# Patient Record
Sex: MALE
[De-identification: names, ages, dates, MRNs, and addresses within clinical notes are randomized per-mention and may not be internally consistent; named-entity substitution may affect disease eponyms.]

---

## 2017-01-29 ENCOUNTER — HOSPITAL ENCOUNTER (EMERGENCY)
Dept: HOSPITAL 71 - ER | Age: 57
Discharge: HOME | End: 2017-01-29
Payer: SELF-PAY

## 2017-01-29 DIAGNOSIS — Z79.899: ICD-10-CM

## 2017-01-29 DIAGNOSIS — F17.210: ICD-10-CM

## 2017-01-29 DIAGNOSIS — R07.2: Primary | ICD-10-CM

## 2017-01-29 DIAGNOSIS — R07.89: ICD-10-CM

## 2017-01-29 DIAGNOSIS — I10: ICD-10-CM

## 2017-01-29 PROCEDURE — 93005 ELECTROCARDIOGRAM TRACING: CPT

## 2017-01-29 PROCEDURE — 84484 ASSAY OF TROPONIN QUANT: CPT

## 2017-01-29 PROCEDURE — 36415 COLL VENOUS BLD VENIPUNCTURE: CPT

## 2017-01-29 PROCEDURE — 96374 THER/PROPH/DIAG INJ IV PUSH: CPT

## 2017-01-29 PROCEDURE — 85025 COMPLETE CBC W/AUTO DIFF WBC: CPT

## 2017-01-29 PROCEDURE — 71010: CPT

## 2017-01-29 PROCEDURE — 85610 PROTHROMBIN TIME: CPT

## 2017-01-29 PROCEDURE — 82550 ASSAY OF CK (CPK): CPT

## 2017-01-29 PROCEDURE — 85730 THROMBOPLASTIN TIME PARTIAL: CPT

## 2017-01-29 PROCEDURE — 83735 ASSAY OF MAGNESIUM: CPT

## 2017-01-29 PROCEDURE — 80053 COMPREHEN METABOLIC PANEL: CPT

## 2020-02-18 ENCOUNTER — OFFICE VISIT CONVERTED (OUTPATIENT)
Dept: GASTROENTEROLOGY | Facility: CLINIC | Age: 60
End: 2020-02-18
Attending: NURSE PRACTITIONER

## 2020-05-04 ENCOUNTER — OFFICE VISIT CONVERTED (OUTPATIENT)
Dept: CARDIOLOGY | Facility: CLINIC | Age: 60
End: 2020-05-04
Attending: INTERNAL MEDICINE

## 2020-07-23 ENCOUNTER — OFFICE VISIT CONVERTED (OUTPATIENT)
Dept: SURGERY | Facility: CLINIC | Age: 60
End: 2020-07-23
Attending: SURGERY

## 2020-09-21 ENCOUNTER — HOSPITAL ENCOUNTER (OUTPATIENT)
Dept: PREADMISSION TESTING | Facility: HOSPITAL | Age: 60
Discharge: HOME | End: 2020-09-21
Attending: SURGERY

## 2020-09-22 LAB — SARS-COV-2 RNA SPEC QL NAA+PROBE: NOT DETECTED

## 2020-09-25 ENCOUNTER — HOSPITAL ENCOUNTER (OUTPATIENT)
Dept: GASTROENTEROLOGY | Facility: HOSPITAL | Age: 60
Setting detail: HOSPITAL OUTPATIENT SURGERY
Discharge: HOME | End: 2020-09-25
Attending: SURGERY

## 2021-05-10 NOTE — H&P
"   History and Physical      Patient Name: Kody Garcia   Patient ID: [id number]   Sex: Male   YOB: 1960        Visit Date: July 23, 2020    Provider: Renny Parks MD   [facility]   Location Address: [address] [place] [address]  [id number]   Location Phone: [phone]          Chief Complaint  · Outpatient History & Physical / Surgical Orders  · Colon Consult      History Of Present Illness  Kody Garcia is a 60 year old /Black male who presents today for evaluation for a colonoscopy. The patient had a colonoscopy many years ago, possibly 20 years ago in [place]. At that time, he was apparently undergoing a drug rehab evaluation and they sent him for a colonoscopy for that. He reports that colonoscopy was normal. He hasn't had a colonoscopy since. He is not currently having any symptoms. He reports occasional left lower quadrant abdominal pain but it is very intermittent. No nausea or vomiting. No blood per rectum, narrowed or pencil thin stools. No unexplained weight loss. He does not have a known family history of colon cancer.       Past Medical History  Essential hypertension; Gastric Ulcer         Past Surgical History  *No Past Surgical History         Medication List  amlodipine 10 mg oral tablet; lisinopril 40 mg oral tablet         Allergy List  NO KNOWN DRUG ALLERGIES       Allergies Reconciled  Family Medical History  No family history of colorectal cancer; Family history of heart disease         Social History  Alcohol use (Never); lives alone; Single; Tobacco (Former); Working         Review of Systems  · Gastrointestinal  o Denies  o : nausea, vomiting, diarrhea, constipation      Vitals  Date Time BP Position Site L\R Cuff Size HR RR TEMP (F) WT  HT  BMI kg/m2 BSA m2 O2 Sat        07/23/2020 10:23 AM       15  240lbs 0oz 6'  1\" 31.66 2.37           Physical Examination  · Constitutional  o Appearance  o : well developed, " well-nourished, alert and in no acute distress  · Head and Face  o Head  o :   § Inspection  § : no deformities or lesions  · Eyes  o Conjunctivae  o : clear  o Sclerae  o : clear  · Neck  o Inspection/Palpation  o : normal appearance, no masses or tenderness, trachea midline  · Respiratory  o Respiratory Effort  o : breathing unlabored  o Inspection of Chest  o : normal appearance, no retractions  · Cardiovascular  o Heart  o : regular rate and rhythm  · Gastrointestinal  o Abdominal Examination  o : abdomen is soft  · Lymphatic  o Neck  o : no lymphadenopathy present  o Axilla  o : no lymphadenopathy present  o Groin  o : no lymphadenopathy present  · Skin and Subcutaneous Tissue  o General Inspection  o : no rashes present, no lesions present, no areas of discoloration  · Neurologic  o Cranial Nerves  o : grossly intact  o Sensation  o : grossly intact  o Gait and Station  o :   § Gait Screening  § : normal gait, able to stand without diffculty  o Cerebellar Function  o : no obvious abnormalities  · Psychiatric  o Judgement and Insight  o : judgment and insight intact  o Mood and Affect  o : mood normal, affect appropriate          Assessment  · Pre-Surgical Orders     V72.84  · Screening for colon cancer     V76.51/Z12.11  · Pre-op testing     V72.84/Z01.818       Patient in need of a colonoscopy for screening     Problems Reconciled  Plan  · Orders  o General Surgery Order (GENOR) - V76.51/Z12.11 - 09/25/2020  o [facility] Pre-Op Covid-19 Screening (31732) - V72.84/Z01.818 - 09/21/2020 09/21 @ 8AM -- [address], [place].  · Medications  o Suprep Bowel Prep Kit 17.5-3.13-1.6 gram oral recon soln   SIG: take as directed for 1 day   DISP: (1) 177 ml bottle with 0 refills  Prescribed on 07/23/2020     o Medications have been Reconciled  o Transition of Care or Provider Policy  · Instructions  o PLAN:   o Handouts Provided-Pre-Procedure Instructions including date and time and location of procedure.  o Surgical  Facility: [facility]  o ****Patient Status****  o Outpatient  o ********************  o RISK AND BENEFITS:  o Consent for surgery: Given these options, the patient has verbally expressed an understanding of the risks of surgery and finds these risks acceptable. We will proceed with surgery as soon as possible.  o Consult Anesthesia for any post-operative block, or any pain management procedure deemed necessary by the anestesiologist for adequate post-operative pain control.   o O.R. PREP: Per protocol  o IV: Per Anesthesia  o PLEASE SIGN PERMIT FOR: COLONOSCOPY WITH POSSIBLE BIOPSIES  o *___The above History and Physical Examination has been completed within 30 days of admission.  o Electronically Identified Patient Education Materials Provided Electronically     We will plan for a screening colonoscopy in the near future. Risks, benefits, and alternatives were discussed with the patient extensively. All questions were answered. The patient voiced understanding and agrees to proceed with the above plan.             Electronically Signed by: Radha Thomas-, -Author on July 23, 2020 03:40:10 PM  Electronically Co-signed by: Renny Parks MD -Reviewer on July 23, 2020 07:50:43 PM

## 2021-05-10 NOTE — H&P
History and Physical      Patient Name: Kody Garcia   Patient ID: [id number]   Sex: Male   YOB: 1960    Primary Care Provider: Provider Other Other   Referring Provider: Saige VIRGEN    Visit Date: May 4, 2020    Provider: Edgar Escamilla MD   Location: [facility]   Location Address: [address] [place] [address], [address]   [place] Zhu  [id number]   Location Phone: [phone]          Chief Complaint  · Reason for Consultation: Abdominal aortic aneurysm.      History Of Present Illness  Consult requested by: Saige VIRGEN   Kody Garcia is a 60 year old /Black male with hypertension and hypertensive heart disease. He was recently seen in the emergency room because of abdominal pain, and as part of his workup, he underwent a CT scan of the abdomen, which incidentally showed the presence of a mildly ectatic abdominal aorta measuring 2.4 cm in diameter. He was subsequently seen by a GI specialist, and a colonoscopy was recommended. However, the procedure was put on hold because of the COVID pandemic. He was referred for further evaluation of the ascending aortic aneurysm. Patient has no history of any cardiac illness in the past. He denies having any chest pain, tightness, or pressure. No shortness of breath on activities. 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 No dizziness. No syncopal episodes. Previous cardiac workup with stress echocardiogram in 2015 showed no ischemic but evidence of hypertensive heart disease.   PAST MEDICAL HISTORY: 1) Hypertension with hypertensive heart disease; 2) Negative for diabetes mellitus or coronary artery disease.   PSYCHOSOCIAL HISTORY: The patient is a current smoker, who smokes 5-6 cigarettes per day. Drinks alcohol rarely. No recreational drug usage.   FAMILY HISTORY: Reviewed. No family history of premature coronary artery disease. Patient's mother had coronary artery bypass grafting at the age  "of 70 years.   CURRENT MEDICATIONS: Amlodipine 10 mg daily; lisinopril 40 mg daily. The dosage and frequency of the medications were reviewed with the patient.   ALLERGIES: No known drug allergies.       Review of Systems  · Constitutional  o Admits  o : good general health lately  o Denies  o : fatigue, recent weight changes   · Eyes  o Denies  o : double vision  · HENT  o Denies  o : hearing loss or ringing, chronic sinus problem, swollen glands in neck  · Cardiovascular  o Denies  o : chest pain, palpitations (fast, fluttering, or skipping beats), swelling (feet, ankles, hands), shortness of breath while walking or lying flat  · Respiratory  o Denies  o : chronic or frequent cough, asthma or wheezing, COPD  · Gastrointestinal  o Denies  o : ulcers, nausea or vomiting  · Neurologic  o Denies  o : lightheaded or dizzy, stroke, headaches  · Musculoskeletal  o Denies  o : joint pain, back pain  · Endocrine  o Denies  o : thyroid disease, diabetes, heat or cold intolerance, excessive thirst or urination  · Heme-Lymph  o Denies  o : bleeding or bruising tendency, anemia      Vitals  Date Time BP Position Site L\R Cuff Size HR RR TEMP (F) WT  HT  BMI kg/m2 BSA m2 O2 Sat HC       05/04/2020 08:12 /100 Sitting    68 - R   241lbs 0oz 6'  1\" 31.8 2.37     05/04/2020 08:12 /96 Sitting                     Physical Examination  · Constitutional  o Appearance  o : Awake, alert, in no acute distress.  · Head and Face  o HEENT  o : No pallor, anicteric. Eyes normal. Moist mucous membranes.  · Neck  o Inspection/Palpation  o : Supple. No hepatosplenomegaly.  o Jugular Veins  o : No JVD. No carotid bruits.  · Respiratory  o Auscultation of Lungs  o : Clear to auscultation bilaterally. No crackles or wheezing.  · Cardiovascular  o Heart  o : S1, S2 normally heard. No S3. No murmur, rubs, or gallops.  · Gastrointestinal  o Abdominal Examination  o : Soft, non-distended. No palpable hepatosplenomegaly. Bowel sounds heard " in all four quadrants.  · Musculoskeletal  o General  o : Normal muscle tone and strength.  · Skin and Subcutaneous Tissue  o General Inspection  o : No skin rashes.  · Extremities  o Extremities  o : Warm and well perfused. Distal pulses present. No pitting pedal edema.  · EKG  o EKG  o : Performed in the office today.  o Indications  o : Abdominal aortic aneurysm.  o Results  o : Normal sinus rhythm. LVH with repolarization abnormalities. T-wave abnormalities consistent with LVH. Abnormal EKG.  o Comparison  o : When compared to previous EKG on 07/20/2019, there are no changes.   · Labs  o Labs  o : On 01/02/2020, hemoglobin 13.1, hematocrit 41.5, WBC 6.48, platelet count 296,000, sodium 141, potassium 4.2, BUN 12, creatinine 1.06, AST 23, ALT 18.          Assessment     ASSESSMENT & PLAN:    1.  Abdominal aortic aneurysm.  Measures 2.4 cm which is small.  Counseled regarding tobacco cessation and        also adequate blood pressure control to arrest further progression.  Will schedule annual surveillance       ultrasound in January 2021.    2.  Hypertension with evidence of hypertensive heart disease per EKG and per echocardiogram in 2015.  Blood        pressures were elevated in the office.  Recommended the patient to keep a log of his blood pressure and        return it to us in the next 2-3 weeks, after which medication adjustments can be made.  Counseled        regarding medication compliance as well.    3.  Follow up in 9 months with repeat ultrasound of abdomen.  In the meantime, we will review the blood        pressure log once returned.        Edgar Escamilla MD  JBUCK/shelley                 Electronically Signed by: Jeanette Lewis-, Other -Author on May 7, 2020 10:00:39 AM  Electronically Co-signed by: Edgar Escamilla MD -Reviewer on May 7, 2020 11:59:42 AM

## 2021-05-15 VITALS
HEIGHT: 73 IN | BODY MASS INDEX: 31.34 KG/M2 | HEART RATE: 72 BPM | DIASTOLIC BLOOD PRESSURE: 109 MMHG | WEIGHT: 236.5 LBS | SYSTOLIC BLOOD PRESSURE: 175 MMHG

## 2021-05-15 VITALS — BODY MASS INDEX: 31.81 KG/M2 | RESPIRATION RATE: 15 BRPM | WEIGHT: 240 LBS | HEIGHT: 73 IN

## 2021-05-15 VITALS
HEIGHT: 73 IN | HEART RATE: 68 BPM | WEIGHT: 241 LBS | DIASTOLIC BLOOD PRESSURE: 100 MMHG | SYSTOLIC BLOOD PRESSURE: 158 MMHG | BODY MASS INDEX: 31.94 KG/M2

## 2021-08-24 ENCOUNTER — OFFICE VISIT (OUTPATIENT)
Dept: CARDIOLOGY | Facility: CLINIC | Age: 61
End: 2021-08-24

## 2021-08-24 VITALS
WEIGHT: 217 LBS | BODY MASS INDEX: 28.76 KG/M2 | HEART RATE: 76 BPM | DIASTOLIC BLOOD PRESSURE: 96 MMHG | HEIGHT: 73 IN | SYSTOLIC BLOOD PRESSURE: 152 MMHG

## 2021-08-24 DIAGNOSIS — I10 ESSENTIAL HYPERTENSION: ICD-10-CM

## 2021-08-24 DIAGNOSIS — I71.40 AAA (ABDOMINAL AORTIC ANEURYSM) WITHOUT RUPTURE (HCC): Primary | ICD-10-CM

## 2021-08-24 DIAGNOSIS — E78.2 MIXED HYPERLIPIDEMIA: ICD-10-CM

## 2021-08-24 PROCEDURE — 99214 OFFICE O/P EST MOD 30 MIN: CPT | Performed by: INTERNAL MEDICINE

## 2021-08-24 NOTE — PROGRESS NOTES
CARDIOLOGY FOLLOW-UP PROGRESS NOTE        Chief Complaint  Follow-up and Hypertension, abdominal aortic aneurysm    Subjective            Kody Garcia presents to [facility] CARDIOLOGY  History of Present Illness    This is a 61-year-old male with hypertension, hypertensive heart disease who was previously seen and evaluated for abdominal CT scan showing ectatic abdominal aorta.  The findings were discussed with the patient including the need for strict blood pressure control and tobacco cessation.  He was last seen in the office in May 2020 and since then lost for follow-up.  Today patient denies having any new complaints.  Denies having any chest pain, shortness of breath or palpitations.  He was diagnosed with SARS-CoV-2 2 weeks back with mild symptoms.  He still has cough and reports having abdominal pain when coughing.  He denies having any fever or chills.  He is out of his nifedipine for the past 1 week but taking losartan HCTZ daily.       Past History:    Medical History: has a past medical history of Essential hypertension and Gastric ulcer.     Surgical History: has no past surgical history on file.     Family History: family history includes Heart disease in his mother; Hyperlipidemia in his mother; Hypertension in his mother.     Social History: reports that he has quit smoking. He has never used smokeless tobacco. He reports that he does not drink alcohol and does not use drugs.    Allergies: Patient has no known allergies.    Current Outpatient Medications on File Prior to Visit   Medication Sig   • losartan-hydrochlorothiazide (HYZAAR) 100-12.5 MG per tablet Take 1 tablet by mouth Daily.   • NIFEdipine XL (PROCARDIA XL) 30 MG 24 hr tablet Take 30 mg by mouth Daily.     No current facility-administered medications on file prior to visit.          Review of Systems   Respiratory: Positive for shortness of breath (Recent SARS-CoV-2, tested +2 weeks back). Negative for cough and  "wheezing.    Cardiovascular: Negative for chest pain, palpitations and leg swelling.   Gastrointestinal: Negative for nausea and vomiting.   Neurological: Negative for dizziness and syncope.        Objective     /96   Pulse 76   Ht 185.4 cm (73\")   Wt 98.4 kg (217 lb)   BMI 28.63 kg/m²       Physical Exam  General : Alert, awake, no acute distress  CVS : Regular rate and rhythm, no murmur, rubs or gallops  Lungs: Clear to auscultation bilaterally, no crackles or rhonchi  Abdomen: Soft, nontender, bowel sounds heard in all 4 quadrants  Extremities: Warm, well-perfused, no pedal edema    Result Review :     The following data was reviewed by: Edgar Escamilla MD on 08/24/2021:      CBC    CBC 6/28/21   WBC 5.62   RBC 4.64   Hemoglobin 12.8 (A)   Hematocrit 39.9 (A)   MCV 86.0   MCH 27.6   MCHC 32.1   RDW 13.8   Platelets 296   (A) Abnormal value                       Assessment and Plan        Diagnoses and all orders for this visit:    1. AAA (abdominal aortic aneurysm) without rupture (CMS/HCC) (Primary)  -     Ultrasound Abdominal Vascular Limited CAR; Future  Ectatic abdominal aorta measuring 2.4 cm per CT scan done in 2020.  We will proceed with an ultrasound of the abdomen to reassess the size.  Patient quit smoking 2 weeks back.  Blood pressure is on the higher side which needs tighter control.    2. Essential hypertension    Blood pressure on the higher side today.  Patient is out of nifedipine for the past 1 week but picking up the refills today.  Recommend to continue same medications for now    3. Mixed hyperlipidemia    .  Since the patient has abdominal aortic aneurysm, recommend statin therapy to LDL below 70.  He reports allergy to Crestor and does not want to be on medicines for now.  Hence I counseled regarding dietary modification and regular exercise.  We will recheck lipid panel in 6 months.        Follow Up     Return in about 6 months (around 2/24/2022) for Recheck, Next " scheduled follow up.    Patient was given instructions and counseling regarding his condition or for health maintenance advice. Please see specific information pulled into the AVS if appropriate.

## 2021-09-02 ENCOUNTER — HOSPITAL ENCOUNTER (OUTPATIENT)
Dept: CARDIOLOGY | Facility: HOSPITAL | Age: 61
Discharge: HOME | End: 2021-09-02
Admitting: INTERNAL MEDICINE

## 2021-09-02 DIAGNOSIS — I71.40 AAA (ABDOMINAL AORTIC ANEURYSM) WITHOUT RUPTURE (HCC): ICD-10-CM

## 2021-09-02 LAB
ABDOMINAL DIST AORTA AP: 1.7 CM
ABDOMINAL DIST AORTA TRANS: 1.7 CM
ABDOMINAL LT COM ILIAC AP: 1.1 CM
ABDOMINAL LT COM ILIAC TRANS: 1 CM
ABDOMINAL MID AORTA AP: 1.7 CM
ABDOMINAL MID AORTA TRANS: 1.7 CM
ABDOMINAL PROX AORTA AP: 2 CM
ABDOMINAL PROX AORTA TRANS: 2 CM
ABDOMINAL RT COM ILIAC AP: 1 CM
ABDOMINAL RT COM ILIAC TRANS: 1 CM
BH CV VAS SMA 1ST PP TIME: 15 MIN
BH CV VAS SMA 2ND PP TIME: 30 MIN
BH CV VAS SMA 3RD PP TIME: 45 MIN
MAXIMAL PREDICTED HEART RATE: 160 BPM
STRESS TARGET HR: 136 BPM

## 2021-09-02 PROCEDURE — 93979 VASCULAR STUDY: CPT

## 2021-09-02 PROCEDURE — 93979 VASCULAR STUDY: CPT | Performed by: SURGERY

## 2021-09-03 ENCOUNTER — TELEPHONE (OUTPATIENT)
Dept: CARDIOLOGY | Facility: CLINIC | Age: 61
End: 2021-09-03

## 2021-09-03 NOTE — TELEPHONE ENCOUNTER
----- Message from Edgar Escamilla MD sent at 9/3/2021 12:45 PM EDT -----  The size of the abdominal aorta is 2 cm, which does not meet the criteria for aneurysm.  Currently no abdominal aneurysm.    On previous CT scan it was reported as 2.6 cm.    No further testing is needed at this time  Continue current medications  Follow-up in 6 months as scheduled earlier.

## 2021-09-03 NOTE — TELEPHONE ENCOUNTER
Left detailed VM with patient regarding results of abdominal u/s. Advised to keep f/u as scheduled.

## 2022-02-16 ENCOUNTER — TRANSCRIBE ORDERS (OUTPATIENT)
Dept: ADMINISTRATIVE | Facility: HOSPITAL | Age: 62
End: 2022-02-16

## 2022-02-16 DIAGNOSIS — R10.9 ABDOMINAL PAIN, UNSPECIFIED ABDOMINAL LOCATION: Primary | ICD-10-CM

## 2022-07-14 ENCOUNTER — OFFICE VISIT (OUTPATIENT)
Dept: PODIATRY | Facility: CLINIC | Age: 62
End: 2022-07-14

## 2022-07-14 VITALS
HEART RATE: 62 BPM | OXYGEN SATURATION: 97 % | WEIGHT: 231.8 LBS | BODY MASS INDEX: 30.72 KG/M2 | HEIGHT: 73 IN | TEMPERATURE: 96.7 F | SYSTOLIC BLOOD PRESSURE: 154 MMHG | DIASTOLIC BLOOD PRESSURE: 86 MMHG

## 2022-07-14 DIAGNOSIS — M79.671 FOOT PAIN, RIGHT: Primary | ICD-10-CM

## 2022-07-14 DIAGNOSIS — L89.891 PRESSURE INJURY OF RIGHT FOOT, STAGE 1: ICD-10-CM

## 2022-07-14 PROCEDURE — 97597 DBRDMT OPN WND 1ST 20 CM/<: CPT | Performed by: PODIATRIST

## 2022-07-14 PROCEDURE — 99203 OFFICE O/P NEW LOW 30 MIN: CPT | Performed by: PODIATRIST

## 2022-07-14 RX ORDER — DICLOFENAC SODIUM 75 MG/1
75 TABLET, DELAYED RELEASE ORAL
COMMUNITY
Start: 2022-06-30

## 2022-07-14 RX ORDER — METHOCARBAMOL 750 MG/1
750 TABLET, FILM COATED ORAL 4 TIMES DAILY
COMMUNITY
Start: 2022-06-30

## 2022-07-14 NOTE — PROGRESS NOTES
[facility] - PODIATRY    Today's Date: 07/14/22    Patient Name: Kody Garcia  MRN: [id number]  CSN: [id number]  PCP: Iona Ravi APRN, Last PCP Visit:  5/9/2022  Referring Provider: Referring, Self    SUBJECTIVE     Chief Complaint   Patient presents with   • Right Foot - Callouses, Establish Care     HPI: Kody Garcia, a 62 y.o.male, comes presents to clinic with chief complaint of:    New, Established, New Problem: New    Location:  hyperkeratotic lesion(s) on plantar right fifth metatarsal head    Duration: Early 2022    Onset:  gradual    Nature:  sore    Stable, worsening, improving: Worsening    Aggravating factors:  Patient reports lesion(s) is painful with shoegear and ambulation.      Previous Treatment:   None    Past Medical History:   Diagnosis Date   • Callus    • Essential hypertension    • Gastric ulcer      History reviewed. No pertinent surgical history.  Family History   Problem Relation Age of Onset   • Hyperlipidemia Mother    • Hypertension Mother    • Heart disease Mother    • Cancer Neg Hx    • Diabetes Neg Hx    • Stroke Neg Hx      Social History     Socioeconomic History   • Marital status: Single   Tobacco Use   • Smoking status: Former Smoker   • Smokeless tobacco: Never Used   • Tobacco comment: Quit 3 weeks   Vaping Use   • Vaping Use: Never used   Substance and Sexual Activity   • Alcohol use: Never   • Drug use: Never   • Sexual activity: Defer     No Known Allergies  Current Outpatient Medications   Medication Sig Dispense Refill   • diclofenac (VOLTAREN) 75 MG EC tablet Take 75 mg by mouth.     • losartan-hydrochlorothiazide (HYZAAR) 100-12.5 MG per tablet Take 1 tablet by mouth Daily.     • methocarbamol (ROBAXIN) 750 MG tablet Take 750 mg by mouth 4 (Four) Times a Day.     • NIFEdipine XL (PROCARDIA XL) 30 MG 24 hr tablet Take 30 mg by mouth Daily.       No current facility-administered medications for this visit.     Review of Systems   Constitutional:  Negative.    Skin:        Hyperkeratotic tissue on plantar right fifth metatarsal head   All other systems reviewed and are negative.      OBJECTIVE     Vitals:    07/14/22 1455   BP: 154/86   Pulse: 62   Temp: 96.7 °F (35.9 °C)   SpO2: 97%       Body mass index is 30.58 kg/m².    Lab Results   Component Value Date    GLUCOSE 82 01/02/2020    CALCIUM 9.5 05/26/2020     05/26/2020    K 4.0 05/26/2020    CO2 26 05/26/2020     05/26/2020    BUN 17 05/26/2020    CREATININE 1.0 05/26/2020    BCR 17.0 05/26/2020    ANIONGAP 11 01/02/2020       Patient seen in no apparent distress.      PHYSICAL EXAM:     Foot/Ankle Exam:       General:   Appearance: appears stated age and healthy    Orientation: AAOx3    Affect: appropriate    Gait: unimpaired    Shoe Gear:  Casual shoes    VASCULAR      Right Foot Vascularity   Normal vascular exam    Dorsalis pedis:  2+  Posterior tibial:  2+  Skin Temperature: warm    Edema Grading:  None  CFT:  < 3 seconds  Pedal Hair Growth:  Present  Varicosities: none       Left Foot Vascularity   Normal vascular exam    Dorsalis pedis:  2+  Posterior tibial:  2+  Skin Temperature: warm    Edema Grading:  None  CFT:  < 3 seconds  Pedal Hair Growth:  Present  Varicosities: none        NEUROLOGIC     Right Foot Neurologic   Normal sensation    Light touch sensation:  Normal  Vibratory sensation:  Normal  Hot/Cold sensation: normal    Protective Sensation using [place]-Eze Monofilament:  10     Left Foot Neurologic   Normal sensation    Light touch sensation:  Normal  Vibratory sensation:  Normal  Hot/cold sensation: normal    Protective Sensation using [place]-Eze Monofilament:  10     MUSCLE STRENGTH     Right Foot Muscle Strength   Foot dorsiflexion:  4  Foot plantar flexion:  4  Foot inversion:  4  Foot eversion:  4     Left Foot Muscle Strength   Foot dorsiflexion:  4  Foot plantar flexion:  4  Foot inversion:  4  Foot eversion:  4     RANGE OF MOTION      Right Foot Range  of Motion   Foot and ankle ROM within normal limits       Left Foot Range of Motion   Foot and ankle ROM within normal limits       DERMATOLOGIC     Right Foot Dermatologic   Skin: ulcer    Nails: normal       Left Foot Dermatologic   Skin: skin intact    Nails: normal        Right Foot Additional Comments Stage 1 ulceration on the plantar right fifth metatarsal head area of the foot.  Hyperkeratotic tissue with subepidermal hemosiderin deposition is present.  No surrounding, edema, erythema, lymphangitis, fluctuance, nor signs of infection.  No drainage present.  13 mm x 10 mm x 3 mm.  This area was debrided via excisional partial thickness debridement with a 10 scalpel blade.  Post debridement measurements were 10 mm x 9 mm x 1 mm in depth.        ASSESSMENT/PLAN     Diagnoses and all orders for this visit:    1. Foot pain, right (Primary)    2. Pressure injury of right foot, stage 1        Comprehensive lower extremity examination and evaluation was performed.    Discussed findings and treatment plan including risks, benefits, and treatment options with patient in detail. Patient agreed with treatment plan.    An After Visit Summary was printed and given to the patient at discharge, including (if requested) any available informative/educational handouts regarding diagnosis, treatment, or medications. All questions were answered to patient/family satisfaction. Should symptoms fail to improve or worsen they agree to call or return to clinic or to go to the Emergency Department. Discussed the importance of following up with any needed screening tests/labs/specialist appointments and any requested follow-up recommended by me today. Importance of maintaining follow-up discussed and patient accepts that missed appointments can delay diagnosis and potentially lead to worsening of conditions.    Return in about 9 weeks (around 9/15/2022) for Ulcer Follow-Up., or sooner if acute issues arise.    This document has been  electronically signed by Baron Peres DPM on July 14, 2022 15:28 EDT

## 2022-08-04 ENCOUNTER — TRANSCRIBE ORDERS (OUTPATIENT)
Dept: ADMINISTRATIVE | Facility: HOSPITAL | Age: 62
End: 2022-08-04

## 2022-08-04 DIAGNOSIS — R10.32 LEFT LOWER QUADRANT ABDOMINAL PAIN: Primary | ICD-10-CM

## 2022-08-26 ENCOUNTER — HOSPITAL ENCOUNTER (OUTPATIENT)
Dept: ULTRASOUND IMAGING | Facility: HOSPITAL | Age: 62
Discharge: HOME | End: 2022-08-26
Admitting: NURSE PRACTITIONER

## 2022-08-26 DIAGNOSIS — R10.32 LEFT LOWER QUADRANT ABDOMINAL PAIN: ICD-10-CM

## 2022-08-26 PROCEDURE — 76700 US EXAM ABDOM COMPLETE: CPT

## 2023-02-10 ENCOUNTER — HOSPITAL ENCOUNTER (EMERGENCY)
Facility: HOSPITAL | Age: 63
Discharge: HOME | End: 2023-02-10
Attending: EMERGENCY MEDICINE | Admitting: EMERGENCY MEDICINE
Payer: COMMERCIAL

## 2023-02-10 ENCOUNTER — APPOINTMENT (OUTPATIENT)
Dept: CT IMAGING | Facility: HOSPITAL | Age: 63
End: 2023-02-10
Payer: COMMERCIAL

## 2023-02-10 VITALS
HEART RATE: 84 BPM | HEIGHT: 73 IN | BODY MASS INDEX: 31.12 KG/M2 | RESPIRATION RATE: 16 BRPM | TEMPERATURE: 98.1 F | WEIGHT: 234.79 LBS | DIASTOLIC BLOOD PRESSURE: 101 MMHG | SYSTOLIC BLOOD PRESSURE: 164 MMHG | OXYGEN SATURATION: 95 %

## 2023-02-10 DIAGNOSIS — R10.84 GENERALIZED ABDOMINAL PAIN: Primary | ICD-10-CM

## 2023-02-10 LAB
ALBUMIN SERPL-MCNC: 4.5 G/DL (ref 3.5–5.2)
ALBUMIN/GLOB SERPL: 1.4 G/DL
ALP SERPL-CCNC: 69 U/L (ref 39–117)
ALT SERPL W P-5'-P-CCNC: 26 U/L (ref 1–41)
ANION GAP SERPL CALCULATED.3IONS-SCNC: 10.6 MMOL/L (ref 5–15)
AST SERPL-CCNC: 25 U/L (ref 1–40)
BASOPHILS # BLD AUTO: 0.03 10*3/MM3 (ref 0–0.2)
BASOPHILS NFR BLD AUTO: 0.4 % (ref 0–1.5)
BILIRUB SERPL-MCNC: 0.5 MG/DL (ref 0–1.2)
BILIRUB UR QL STRIP: NEGATIVE
BUN SERPL-MCNC: 23 MG/DL (ref 8–23)
BUN/CREAT SERPL: 17.3 (ref 7–25)
CALCIUM SPEC-SCNC: 10.1 MG/DL (ref 8.6–10.5)
CHLORIDE SERPL-SCNC: 103 MMOL/L (ref 98–107)
CLARITY UR: CLEAR
CO2 SERPL-SCNC: 25.4 MMOL/L (ref 22–29)
COLOR UR: YELLOW
CREAT SERPL-MCNC: 1.33 MG/DL (ref 0.76–1.27)
D-LACTATE SERPL-SCNC: 0.7 MMOL/L (ref 0.5–2)
DEPRECATED RDW RBC AUTO: 42.5 FL (ref 37–54)
EGFRCR SERPLBLD CKD-EPI 2021: 60.8 ML/MIN/1.73
EOSINOPHIL # BLD AUTO: 0.02 10*3/MM3 (ref 0–0.4)
EOSINOPHIL NFR BLD AUTO: 0.2 % (ref 0.3–6.2)
ERYTHROCYTE [DISTWIDTH] IN BLOOD BY AUTOMATED COUNT: 13.7 % (ref 12.3–15.4)
GLOBULIN UR ELPH-MCNC: 3.2 GM/DL
GLUCOSE SERPL-MCNC: 109 MG/DL (ref 65–99)
GLUCOSE UR STRIP-MCNC: NEGATIVE MG/DL
HCT VFR BLD AUTO: 41.2 % (ref 37.5–51)
HGB BLD-MCNC: 13.7 G/DL (ref 13–17.7)
HGB UR QL STRIP.AUTO: NEGATIVE
HOLD SPECIMEN: NORMAL
HOLD SPECIMEN: NORMAL
IMM GRANULOCYTES # BLD AUTO: 0.04 10*3/MM3 (ref 0–0.05)
IMM GRANULOCYTES NFR BLD AUTO: 0.5 % (ref 0–0.5)
KETONES UR QL STRIP: NEGATIVE
LEUKOCYTE ESTERASE UR QL STRIP.AUTO: NEGATIVE
LIPASE SERPL-CCNC: 21 U/L (ref 13–60)
LYMPHOCYTES # BLD AUTO: 1.85 10*3/MM3 (ref 0.7–3.1)
LYMPHOCYTES NFR BLD AUTO: 22.7 % (ref 19.6–45.3)
MCH RBC QN AUTO: 28.2 PG (ref 26.6–33)
MCHC RBC AUTO-ENTMCNC: 33.3 G/DL (ref 31.5–35.7)
MCV RBC AUTO: 84.8 FL (ref 79–97)
MONOCYTES # BLD AUTO: 0.61 10*3/MM3 (ref 0.1–0.9)
MONOCYTES NFR BLD AUTO: 7.5 % (ref 5–12)
NEUTROPHILS NFR BLD AUTO: 5.59 10*3/MM3 (ref 1.7–7)
NEUTROPHILS NFR BLD AUTO: 68.7 % (ref 42.7–76)
NITRITE UR QL STRIP: NEGATIVE
NRBC BLD AUTO-RTO: 0 /100 WBC (ref 0–0.2)
PH UR STRIP.AUTO: 5.5 [PH] (ref 5–8)
PLATELET # BLD AUTO: 322 10*3/MM3 (ref 140–450)
PMV BLD AUTO: 9.2 FL (ref 6–12)
POTASSIUM SERPL-SCNC: 3.7 MMOL/L (ref 3.5–5.2)
PROT SERPL-MCNC: 7.7 G/DL (ref 6–8.5)
PROT UR QL STRIP: ABNORMAL
RBC # BLD AUTO: 4.86 10*6/MM3 (ref 4.14–5.8)
SODIUM SERPL-SCNC: 139 MMOL/L (ref 136–145)
SP GR UR STRIP: 1.02 (ref 1–1.03)
UROBILINOGEN UR QL STRIP: ABNORMAL
WBC NRBC COR # BLD: 8.14 10*3/MM3 (ref 3.4–10.8)
WHOLE BLOOD HOLD COAG: NORMAL
WHOLE BLOOD HOLD SPECIMEN: NORMAL

## 2023-02-10 PROCEDURE — 93005 ELECTROCARDIOGRAM TRACING: CPT | Performed by: EMERGENCY MEDICINE

## 2023-02-10 PROCEDURE — 93005 ELECTROCARDIOGRAM TRACING: CPT

## 2023-02-10 PROCEDURE — 80053 COMPREHEN METABOLIC PANEL: CPT | Performed by: EMERGENCY MEDICINE

## 2023-02-10 PROCEDURE — 85025 COMPLETE CBC W/AUTO DIFF WBC: CPT | Performed by: EMERGENCY MEDICINE

## 2023-02-10 PROCEDURE — 81003 URINALYSIS AUTO W/O SCOPE: CPT | Performed by: EMERGENCY MEDICINE

## 2023-02-10 PROCEDURE — 83690 ASSAY OF LIPASE: CPT | Performed by: EMERGENCY MEDICINE

## 2023-02-10 PROCEDURE — 36415 COLL VENOUS BLD VENIPUNCTURE: CPT

## 2023-02-10 PROCEDURE — 99283 EMERGENCY DEPT VISIT LOW MDM: CPT

## 2023-02-10 PROCEDURE — 74176 CT ABD & PELVIS W/O CONTRAST: CPT

## 2023-02-10 PROCEDURE — 93010 ELECTROCARDIOGRAM REPORT: CPT | Performed by: INTERNAL MEDICINE

## 2023-02-10 PROCEDURE — 36415 COLL VENOUS BLD VENIPUNCTURE: CPT | Performed by: EMERGENCY MEDICINE

## 2023-02-10 PROCEDURE — 83605 ASSAY OF LACTIC ACID: CPT | Performed by: EMERGENCY MEDICINE

## 2023-02-10 RX ORDER — SODIUM CHLORIDE 0.9 % (FLUSH) 0.9 %
10 SYRINGE (ML) INJECTION AS NEEDED
Status: DISCONTINUED | OUTPATIENT
Start: 2023-02-10 | End: 2023-02-10 | Stop reason: HOSPADM

## 2023-02-10 RX ORDER — OMEPRAZOLE 20 MG/1
1 CAPSULE, DELAYED RELEASE ORAL DAILY
COMMUNITY
Start: 2022-12-12

## 2023-02-10 RX ORDER — AMLODIPINE BESYLATE 10 MG/1
1 TABLET ORAL DAILY
COMMUNITY
Start: 2023-01-27

## 2023-02-10 RX ORDER — AMLODIPINE BESYLATE 10 MG/1
10 TABLET ORAL DAILY
COMMUNITY
Start: 2023-01-27

## 2023-02-10 NOTE — ED NOTES
Patient made several statements about needing to retire and about his lady stressing him out. Then went on to discuss he needs to lose weight, seems to be dealing with life stressors at this time. Denies any si/hi

## 2023-02-10 NOTE — ED TRIAGE NOTES
Patient states he has been having stomach pains that radiate up to his chest. Patient states this has been going off and on for a months and that his provider placed him on two new medications. Patient states he had a chest pain episode today and that he had an ultrasound for his stomach and they could not find anything and that he has terrible acid reflux

## 2023-02-10 NOTE — ED PROVIDER NOTES
Time: 6:51 AM EST  Date of encounter:  2/10/2023  Independent Historian/Clinical History and Information was obtained by:   Patient  Chief Complaint: abdominal pain    History is limited by: N/A    History of Present Illness:  Patient is a 62 y.o. year old male who presents to the emergency department for evaluation of intermittent sharp L-sided abd pain radiating up to his chest for 6 months. His PCP started him on 2 new medications. Pt recently had ultrasound which was normal except acid reflux. He is having normal BM. Denies hx of abd issues or abd surgeries. Pt does not drink or smoke. Pt had a normal colonoscopy. Denies n/v.      History provided by:  Patient   used: No        Patient Care Team  Primary Care Provider: Iona Ravi APRN    Past Medical History:     No Known Allergies  Past Medical History:   Diagnosis Date   • Callus    • Essential hypertension    • Gastric ulcer      History reviewed. No pertinent surgical history.  Family History   Problem Relation Age of Onset   • Hyperlipidemia Mother    • Hypertension Mother    • Heart disease Mother    • Cancer Neg Hx    • Diabetes Neg Hx    • Stroke Neg Hx        Home Medications:  Prior to Admission medications    Medication Sig Start Date End Date Taking? Authorizing Provider   amLODIPine (NORVASC) 10 MG tablet Take 10 mg by mouth Daily. 1/27/23  Yes Lisa Navarro MD   amLODIPine (NORVASC) 10 MG tablet Take 1 tablet by mouth Daily. 1/27/23   Lisa Navarro MD   diclofenac (VOLTAREN) 75 MG EC tablet Take 75 mg by mouth. 6/30/22   Lisa Navarro MD   losartan-hydrochlorothiazide (HYZAAR) 100-12.5 MG per tablet Take 1 tablet by mouth Daily. 6/28/21   Emergency, Nurse Douglas, RN   methocarbamol (ROBAXIN) 750 MG tablet Take 750 mg by mouth 4 (Four) Times a Day. 6/30/22   Lisa Navarro MD   NIFEdipine XL (PROCARDIA XL) 30 MG 24 hr tablet Take 30 mg by mouth Daily. 6/28/21   Emergency, Nurse Cole, RN  "  omeprazole (priLOSEC) 20 MG capsule Take 1 capsule by mouth Daily. 12/12/22   Provider, Lisa, MD        Social History:   Social History     Tobacco Use   • Smoking status: Former   • Smokeless tobacco: Never   • Tobacco comments:     Quit 3 weeks   Vaping Use   • Vaping Use: Never used   Substance Use Topics   • Alcohol use: Never   • Drug use: Never         Review of Systems:  Review of Systems   Constitutional: Negative for chills and fever.   HENT: Negative for congestion, rhinorrhea and sore throat.    Eyes: Negative for pain and visual disturbance.   Respiratory: Negative for apnea, cough, chest tightness and shortness of breath.    Cardiovascular: Negative for chest pain and palpitations.   Gastrointestinal: Positive for abdominal pain. Negative for diarrhea, nausea and vomiting.   Genitourinary: Negative for difficulty urinating and dysuria.   Musculoskeletal: Negative for joint swelling and myalgias.   Skin: Negative for color change.   Neurological: Negative for seizures and headaches.   Psychiatric/Behavioral: Negative.    All other systems reviewed and are negative.       Physical Exam:  BP (!) 164/101 (BP Location: Left arm, Patient Position: Sitting)   Pulse 84   Temp 98.1 °F (36.7 °C) (Oral)   Resp 16   Ht 185.4 cm (73\")   Wt 107 kg (234 lb 12.6 oz)   SpO2 95%   BMI 30.98 kg/m²     Physical Exam  Vitals and nursing note reviewed.   Constitutional:       General: He is not in acute distress.     Appearance: Normal appearance. He is not toxic-appearing.   HENT:      Head: Normocephalic and atraumatic.      Jaw: There is normal jaw occlusion.   Eyes:      General: Lids are normal.      Extraocular Movements: Extraocular movements intact.      Conjunctiva/sclera: Conjunctivae normal.      Pupils: Pupils are equal, round, and reactive to light.   Cardiovascular:      Rate and Rhythm: Normal rate and regular rhythm.      Pulses: Normal pulses.      Heart sounds: Normal heart sounds. "   Pulmonary:      Effort: Pulmonary effort is normal. No respiratory distress.      Breath sounds: Normal breath sounds. No wheezing or rhonchi.   Abdominal:      General: Abdomen is flat.      Palpations: Abdomen is soft.      Tenderness: There is no abdominal tenderness. There is left CVA tenderness (mild). There is no guarding or rebound.   Musculoskeletal:         General: Normal range of motion.      Cervical back: Normal range of motion and neck supple.      Right lower leg: No edema.      Left lower leg: No edema.   Skin:     General: Skin is warm and dry.   Neurological:      Mental Status: He is alert and oriented to person, place, and time. Mental status is at baseline.   Psychiatric:         Mood and Affect: Mood normal.                  Procedures:  Procedures      Medical Decision Making:      Comorbidities that affect care:    Hypertension    External Notes reviewed:    Previous Clinic Note: January of this year per patient was seen for hypertension management, GERD. and Previous Radiological Studies: Abdominal ultrasound performed last year, no acute disease.      The following orders were placed and all results were independently analyzed by me:  Orders Placed This Encounter   Procedures   • CT Abdomen Pelvis Without Contrast   • [place] Draw   • Comprehensive Metabolic Panel   • Lipase   • Urinalysis With Microscopic If Indicated (No Culture) - Urine, Clean Catch   • Lactic Acid, Plasma   • CBC Auto Differential   • NPO Diet NPO Type: Strict NPO   • Undress & Gown   • ECG 12 Lead Chest Pain   • ECG 12 Lead Chest Pain   • Insert Peripheral IV   • CBC & Differential   • Green Top (Gel)   • Lavender Top   • Gold Top - SST   • Light Blue Top       Medications Given in the Emergency Department:  Medications   sodium chloride 0.9 % flush 10 mL (has no administration in time range)        ED Course:         Labs:    Lab Results (last 24 hours)     Procedure Component Value Units Date/Time    CBC &  Differential [[id number]]  (Abnormal) Collected: 02/10/23 0223    Specimen: Blood Updated: 02/10/23 0257    Narrative:      The following orders were created for panel order CBC & Differential.  Procedure                               Abnormality         Status                     ---------                               -----------         ------                     CBC Auto Differential[[id number]]        Abnormal            Final result                 Please view results for these tests on the individual orders.    Comprehensive Metabolic Panel [[id number]]  (Abnormal) Collected: 02/10/23 0223    Specimen: Blood Updated: 02/10/23 0254     Glucose 109 mg/dL      BUN 23 mg/dL      Creatinine 1.33 mg/dL      Sodium 139 mmol/L      Potassium 3.7 mmol/L      Chloride 103 mmol/L      CO2 25.4 mmol/L      Calcium 10.1 mg/dL      Total Protein 7.7 g/dL      Albumin 4.5 g/dL      ALT (SGPT) 26 U/L      AST (SGOT) 25 U/L      Alkaline Phosphatase 69 U/L      Total Bilirubin 0.5 mg/dL      Globulin 3.2 gm/dL      A/G Ratio 1.4 g/dL      BUN/Creatinine Ratio 17.3     Anion Gap 10.6 mmol/L      eGFR 60.8 mL/min/1.73     Narrative:      GFR Normal >60  Chronic Kidney Disease <60  Kidney Failure <15      Lipase [[id number]]  (Normal) Collected: 02/10/23 0223    Specimen: Blood Updated: 02/10/23 0254     Lipase 21 U/L     Lactic Acid, Plasma [[id number]]  (Normal) Collected: 02/10/23 0223    Specimen: Blood Updated: 02/10/23 0252     Lactate 0.7 mmol/L     CBC Auto Differential [[id number]]  (Abnormal) Collected: 02/10/23 0223    Specimen: Blood Updated: 02/10/23 0257     WBC 8.14 10*3/mm3      RBC 4.86 10*6/mm3      Hemoglobin 13.7 g/dL      Hematocrit 41.2 %      MCV 84.8 fL      MCH 28.2 pg      MCHC 33.3 g/dL      RDW 13.7 %      RDW-SD 42.5 fl      MPV 9.2 fL      Platelets 322 10*3/mm3      Neutrophil % 68.7 %      Lymphocyte % 22.7 %      Monocyte % 7.5 %      Eosinophil % 0.2 %      Basophil % 0.4 %      Immature Grans % 0.5 %       Neutrophils, Absolute 5.59 10*3/mm3      Lymphocytes, Absolute 1.85 10*3/mm3      Monocytes, Absolute 0.61 10*3/mm3      Eosinophils, Absolute 0.02 10*3/mm3      Basophils, Absolute 0.03 10*3/mm3      Immature Grans, Absolute 0.04 10*3/mm3      nRBC 0.0 /100 WBC     Urinalysis With Microscopic If Indicated (No Culture) - Urine, Clean Catch [[id number]]  (Abnormal) Collected: 02/10/23 0257    Specimen: Urine, Clean Catch Updated: 02/10/23 0305     Color, UA Yellow     Appearance, UA Clear     pH, UA 5.5     Specific Gravity, UA 1.023     Glucose, UA Negative     Ketones, UA Negative     Bilirubin, UA Negative     Blood, UA Negative     Protein, UA Trace     Leuk Esterase, UA Negative     Nitrite, UA Negative     Urobilinogen, UA 0.2 E.U./dL    Narrative:      Urine microscopic not indicated.           Imaging:    CT Abdomen Pelvis Without Contrast    Result Date: 2/10/2023  PROCEDURE: CT ABDOMEN PELVIS WO CONTRAST  COMPARISON: [facility], CT, ABDOMEN/PELVIS WITH CONTRAST, 1/02/2020, 17:17.  INDICATIONS: left flank pain  TECHNIQUE: CT images were created without intravenous contrast.   PROTOCOL:   Standard imaging protocol performed    RADIATION:   DLP: 694.2 mGy*cm   Automated exposure control was utilized to minimize radiation dose.  FINDINGS:  There is no definite abnormality of the liver.  No definite gallstones are seen within the gallbladder.  The spleen and pancreas are grossly unremarkable.  There is a nodular area to the left adrenal gland measuring 1.6 x 1.6 cm which has been suggested.  This could relate to pituitary adenoma.  There is no definite renal abnormality.  There is no acute bowel abnormality.  The bladder does not appear unusual for the degree of distention.  Atherosclerotic changes are noted involving the abdominal aorta.  The infrarenal abdominal aorta measures about 2.48 x 1.87 cm.  There are degenerative changes involving the lumbar spine.        1. Left adrenal lesion which  could relate to adenoma and is not significantly changed. 2. Multilevel degenerative change lumbar spine. 3. Atherosclerotic changes are present.     NAHOMY REDMAN MD       Electronically Signed and Approved By: NAHOMY REDMAN MD on 2/10/2023 at 7:48                 Differential Diagnosis and Discussion:    Abdominal Pain: Based on the patient's signs and symptoms, I considered abdominal aortic aneurysm, small bowel obstruction, pancreatitis, acute cholecystitis, acute appendecitis, peptic ulcer disease, gastritis, colitis, endocrine disorders, irritable bowel syndrome and other differential diagnosis an etiology of the patient's abdominal pain.    All labs were reviewed and interpreted by me.  CT scan radiology interpretation was reviewed by me.    MDM  Number of Diagnoses or Management Options  Generalized abdominal pain  Diagnosis management comments: In summary this is a 62-year-old male who presents to the emergency department for evaluation of abdominal pain ongoing for several months but recently worsened.  CBC independently reviewed by me and shows no critical abnormalities.  CMP independently reviewed by me and shows no critical abnormalities.  CT scan of the abdomen pelvis is unremarkable as well.  Patient is otherwise well-appearing in no acute distress.  Unsure the cause of the patient's abdominal pain at this time but he will be referred to GI for further follow-up and work-up.             Patient Care Considerations:    CONSULT: I considered consulting Gastroenterology, however Patient is stable and CT scan abdomen pelvis unremarkable.      Consultants/Shared Management Plan:    None    Social Determinants of Health:    Patient is independent, reliable, and has access to care.       Disposition and Care Coordination:    Discharged: The patient is suitable and stable for discharge with no need for consideration of observation or admission.    I have explained the patient´s condition, diagnoses and  treatment plan based on the information available to me at this time. I have answered questions and addressed any concerns. The patient has a good  understanding of the patient´s diagnosis, condition, and treatment plan as can be expected at this point. The vital signs have been stable. The patient´s condition is stable and appropriate for discharge from the emergency department.      The patient will pursue further outpatient evaluation with the primary care physician or other designated or consulting physician as outlined in the discharge instructions. They are agreeable to this plan of care and follow-up instructions have been explained in detail. The patient has received these instructions in written format and have expressed an understanding of the discharge instructions. The patient is aware that any significant change in condition or worsening of symptoms should prompt an immediate return to this or the closest emergency department or call to 911.  I have explained discharge medications and the need for follow up with the patient/caretakers. This was also printed in the discharge instructions. Patient was discharged with the following medications and follow up:      Medication List      No changes were made to your prescriptions during this visit.      Anna Alexander MD  [address] [address]  [phone]    Call          Final diagnoses:   Generalized abdominal pain        ED Disposition     ED Disposition   Discharge    Condition   Stable    Comment   --             This medical record created using voice recognition software.    Documentation assistance provided by Tate Rodrigues acting as scribe for Fredy Hastings MD. Information recorded by the scribe was done at my direction and has been verified and validated by me.          Tate Rodrigues  02/10/23 0704       Fredy Hastings MD  02/10/23 0839

## 2023-03-01 LAB
QT INTERVAL: 380 MS
QT INTERVAL: 435 MS

## 2023-04-17 ENCOUNTER — PREP FOR SURGERY (OUTPATIENT)
Dept: OTHER | Facility: HOSPITAL | Age: 63
End: 2023-04-17
Payer: COMMERCIAL

## 2023-04-17 ENCOUNTER — CLINICAL SUPPORT (OUTPATIENT)
Dept: GASTROENTEROLOGY | Facility: CLINIC | Age: 63
End: 2023-04-17
Payer: COMMERCIAL

## 2023-04-17 DIAGNOSIS — K21.9 GASTROESOPHAGEAL REFLUX DISEASE, UNSPECIFIED WHETHER ESOPHAGITIS PRESENT: Primary | ICD-10-CM

## 2023-04-17 NOTE — PROGRESS NOTES
Kody Garcia  1960  63 y.o.    Reason for call: GERD  Prep prescribed: N/A  Prep instructions reviewed with patient and sent to patient via regular mail to the home address on file  Clearance needed? No  If yes, what clearance is needed? N/A  Clearance has been requested from NA   The patient has been scheduled for: EGD  Family history of colon cancer? No  If yes, indicate relative: NA   Family History   Problem Relation Age of Onset   • Hyperlipidemia Mother    • Hypertension Mother    • Heart disease Mother    • Cancer Neg Hx    • Diabetes Neg Hx    • Stroke Neg Hx      Past Medical History:   Diagnosis Date   • Callus    • Essential hypertension    • Gastric ulcer    • GERD (gastroesophageal reflux disease)      No Known Allergies  Past Surgical History:   Procedure Laterality Date   • COLONOSCOPY       Social History     Socioeconomic History   • Marital status: Single   Tobacco Use   • Smoking status: Some Days     Types: Cigarettes   • Smokeless tobacco: Never   Vaping Use   • Vaping Use: Never used   Substance and Sexual Activity   • Alcohol use: Never   • Drug use: Never   • Sexual activity: Defer       Current Outpatient Medications:   •  amLODIPine (NORVASC) 10 MG tablet, Take 1 tablet by mouth Daily., Disp: , Rfl:   •  diclofenac (VOLTAREN) 75 MG EC tablet, Take 1 tablet by mouth., Disp: , Rfl:   •  losartan-hydrochlorothiazide (HYZAAR) 100-12.5 MG per tablet, Take 1 tablet by mouth Daily., Disp: , Rfl:   •  amLODIPine (NORVASC) 10 MG tablet, Take 1 tablet by mouth Daily., Disp: , Rfl:   •  methocarbamol (ROBAXIN) 750 MG tablet, Take 750 mg by mouth 4 (Four) Times a Day. (Patient not taking: Reported on 4/17/2023), Disp: , Rfl:   •  NIFEdipine XL (PROCARDIA XL) 30 MG 24 hr tablet, Take 30 mg by mouth Daily. (Patient not taking: Reported on 4/17/2023), Disp: , Rfl:   •  omeprazole (priLOSEC) 20 MG capsule, Take 1 capsule by mouth Daily. (Patient not taking: Reported on 4/17/2023), Disp: , Rfl: